# Patient Record
Sex: FEMALE | Race: WHITE
[De-identification: names, ages, dates, MRNs, and addresses within clinical notes are randomized per-mention and may not be internally consistent; named-entity substitution may affect disease eponyms.]

---

## 2020-05-08 ENCOUNTER — HOSPITAL ENCOUNTER (EMERGENCY)
Dept: HOSPITAL 56 - MW.ED | Age: 45
Discharge: HOME | End: 2020-05-08
Payer: COMMERCIAL

## 2020-05-08 DIAGNOSIS — S61.011A: Primary | ICD-10-CM

## 2020-05-08 DIAGNOSIS — Z23: ICD-10-CM
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
## 2020-05-08 DIAGNOSIS — F17.210: ICD-10-CM

## 2020-05-08 PROCEDURE — 12001 RPR S/N/AX/GEN/TRNK 2.5CM/<: CPT

## 2020-05-08 PROCEDURE — 99282 EMERGENCY DEPT VISIT SF MDM: CPT

## 2020-05-08 PROCEDURE — 90471 IMMUNIZATION ADMIN: CPT

## 2020-05-08 PROCEDURE — 90715 TDAP VACCINE 7 YRS/> IM: CPT

## 2020-05-08 NOTE — EDM.PDOC
ED HPI GENERAL MEDICAL PROBLEM





- General


Chief Complaint: Laceration


Stated Complaint: CUT FINGER


Time Seen by Provider: 05/08/20 11:32


Source of Information: Reports: Patient


History Limitations: Reports: No Limitations





- History of Present Illness


INITIAL COMMENTS - FREE TEXT/NARRATIVE: 





HISTORY AND PHYSICAL:





History of present illness:


Patient is a 44-year-old female presents to the ED with complaint of thumb 

laceration. Patient states about 1 hour prior to arrival to the ED she was 

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 Patient 

is uncertain of last tetanus. 





Review of systems: 


As per history of present illness and below otherwise all systems reviewed and 

negative.





Past medical history: 


As per history of present illness and as reviewed below otherwise 

noncontributory.





Surgical history: 


As per history of present illness and as reviewed below otherwise 

noncontributory.





Social history: 


No reported history of drug or alcohol abuse.





Family history: 


As per history of present illness and as reviewed below otherwise 

noncontributory.





Physical exam:


General: Patient sitting comfortably in no acute distress and nontoxic appearing


HEENT: Atraumatic, normocephalic, pupils reactive, negative for conjunctival 

pallor or scleral icterus, mucous membranes moist, throat clear, neck supple, 

nontender, trachea midline. No meningeal signs. 


Skin: The very distal tip of the right thumb is avulsed. Bleeding controlled. 

There is no proximal nail or bony involvement. 


Extremities: Atraumatic, negative for cords or calf pain. Neurovascular 

unremarkable.


Neuro: Awake, alert, oriented. Cranial nerves II through XII unremarkable. 

Cerebellum unremarkable. Motor and sensory unremarkable throughout. Exam 

nonfocal.





Notes: No active bleeding to right distal thumb. Wound was cleansed with 250cc 

saline. Dermabond applied to the area. Patient tolerated well. 





Therapeutics:


tdap





Impression: 


Right thumb injury 





Plan:


Keep the area clean and dry as instructed


Follow up with primary care provider


Return to ED as needed as discussed 





Definitive disposition and diagnosis as appropriate pending reevaluation and 

review of above.








  ** Right Finger-Thumb


Pain Score (Numeric/FACES): 5





- Related Data


 Allergies











Allergy/AdvReac Type Severity Reaction Status Date / Time


 


No Known Allergies Allergy   Verified 05/08/20 11:27











Home Meds: 


 Home Meds





. [No Known Home Meds]  05/08/20 [History]











Past Medical History


HEENT History: Reports: None


Cardiovascular History: Reports: None


Respiratory History: Reports: None


Gastrointestinal History: Reports: None


Genitourinary History: Reports: None


OB/GYN History: Reports: Pregnancy


Musculoskeletal History: Reports: None


Neurological History: Reports: None


Psychiatric History: Reports: Anxiety


Endocrine/Metabolic History: Reports: None


Hematologic History: Reports: None


Immunologic History: Reports: None


Oncologic (Cancer) History: Reports: None


Dermatologic History: Reports: None





- Infectious Disease History


Infectious Disease History: Reports: Measles, Mumps, Pertussis (Whooping Cough)

, Rubella





- Past Surgical History


Head Surgeries/Procedures: Reports: None





Social & Family History





- Tobacco Use


Smoking Status *Q: Current Every Day Smoker


Years of Tobacco use: 28


Packs/Tins Daily: 0.5





- Recreational Drug Use


Recreational Drug Use: No





ED ROS GENERAL





- Review of Systems


Review Of Systems: Comprehensive ROS is negative, except as noted in HPI.





ED EXAM, SKIN/RASH


Exam: See Below (see dictation)





Course





- Vital Signs


Last Recorded V/S: 


 Last Vital Signs











Temp  97.8 F   05/08/20 11:25


 


Pulse  88   05/08/20 11:25


 


Resp  16   05/08/20 11:25


 


BP  176/139 H  05/08/20 11:25


 


Pulse Ox  99   05/08/20 11:25














- Orders/Labs/Meds


Orders: 


 Active Orders 24 hr











 Category Date Time Status


 


 Vaccines to be Administered [RC] PER UNIT ROUTINE Care  05/08/20 11:37 Ordered











Meds: 


Medications














Discontinued Medications














Generic Name Dose Route Start Last Admin





  Trade Name Freq  PRN Reason Stop Dose Admin


 


Diphtheria/Tetanus/Acell Pertussis  0.5 ml  05/08/20 11:37  





  Adacel  IM  05/08/20 11:38  





  .ONCE ONE   





     





     





     





     


 


Octyl Cyanoacrylate  1 applic  05/08/20 11:37  





  Dermabond Advance  TOP  05/08/20 11:38  





  ONETIME ONE   





     





     





     





     














Departure





- Departure


Time of Disposition: 11:54


Disposition: Home, Self-Care 01


Condition: Good


Clinical Impression: 


 Injury of right thumb








- Discharge Information


Referrals: 


PCP,None [Primary Care Provider] - 


Forms:  ED Department Discharge


Additional Instructions: 


The following information is given to patients seen in the emergency department 

who are being discharged to home. This information is to outline your options 

for follow-up care. We provide all patients seen in our emergency department 

with a follow-up referral.





The need for follow-up, as well as the timing and circumstances, are variable 

depending upon the specifics of your emergency department visit.





If you don't have a primary care physician on staff, we will provide you with a 

referral. We always advise you to contact your personal physician following an 

emergency department visit to inform them of the circumstance of the visit and 

for follow-up with them and/or the need for any referrals to a consulting 

specialist.





The emergency department will also refer you to a specialist when appropriate. 

This referral assures that you have the opportunity for follow-up care with a 

specialist. All of these measure are taken in an effort to provide you with 

optimal care, which includes your follow-up.





Under all circumstances we always encourage you to contact your private 

physician who remains a resource for coordinating your care. When calling for 

follow-up care, please make the office aware that this follow-up is from your 

recent emergency room visit. If for any reason you are refused follow-up, 

please contact the [facility] Emergency 

Department at [phone] and asked to speak to the emergency department 

charge nurse.





[facility]


Primary Care


[address] [place] [address]


Phone: [phone]


Fax: [phone]





[place] [address]


Phone: [phone]


Fax: [phone]








Keep the area clean and dry as instructed


Follow up with primary care provider


Return to ED as needed as discussed 





Sepsis Event Note





- Evaluation


Sepsis Screening Result: No Definite Risk





- Focused Exam


Vital Signs: 


 Vital Signs











  Temp Pulse Resp BP Pulse Ox


 


 05/08/20 11:25  97.8 F  88  16  176/139 H  99











Date Exam was Performed: 05/08/20


Time Exam was Performed: 11:52





- My Orders


Last 24 Hours: 


My Active Orders





05/08/20 11:37


Vaccines to be Administered [RC] PER UNIT ROUTINE 














- Assessment/Plan


Last 24 Hours: 


My Active Orders





05/08/20 11:37


Vaccines to be Administered [RC] PER UNIT ROUTINE

## 2021-08-24 NOTE — EDM.PDOCBH
ED HPI GENERAL MEDICAL PROBLEM





- General


Chief Complaint: Behavioral/Psych


Stated Complaint: ANXIETY, DEPRESSION, THOUGHTS OF SELF HARM


Time Seen by Provider: 08/24/21 07:41


Source of Information: Reports: Patient


History Limitations: Reports: No Limitations





- History of Present Illness


INITIAL COMMENTS - FREE TEXT/NARRATIVE: 





Patient is a 46-year-old female brought in today for possible anxiety and 

feeling worthless.  Patient states that for the past few days she has been 

feeling very anxious and had difficulty trying to get herself to go to work or 

take care of her son.  She denies also 1 hurt anyone else but has thought about 

hurting herself because she is is sick of feeling this way in detail make the 

symptoms go away.  She denies any complaints of nausea vomiting fever chills 

abdominal pain.  She denies hearing voices or take any drugs or alcohol.





- Related Data


                                    Allergies











Allergy/AdvReac Type Severity Reaction Status Date / Time


 


No Known Allergies Allergy   Verified 08/24/21 07:44











Home Meds: 


                                    Home Meds





. [No Known Home Meds]  05/08/20 [History]











Past Medical History


HEENT History: Reports: None


Cardiovascular History: Reports: None


Respiratory History: Reports: None


Gastrointestinal History: Reports: None


Genitourinary History: Reports: None


OB/GYN History: Reports: Pregnancy


Musculoskeletal History: Reports: None


Neurological History: Reports: None


Psychiatric History: Reports: Anxiety


Endocrine/Metabolic History: Reports: None


Hematologic History: Reports: None


Immunologic History: Reports: None


Oncologic (Cancer) History: Reports: None


Dermatologic History: Reports: None





- Infectious Disease History


Infectious Disease History: Reports: Measles, Mumps, Pertussis (Whooping Cough),

 Rubella





- Past Surgical History


Head Surgeries/Procedures: Reports: None





ED ROS GENERAL





- Review of Systems


Review Of Systems: See Below


Constitutional: Reports: No Symptoms


HEENT: Reports: No Symptoms


Respiratory: Reports: No Symptoms


Cardiovascular: Reports: No Symptoms


Endocrine: Reports: No Symptoms


GI/Abdominal: Reports: No Symptoms


: Reports: No Symptoms


Musculoskeletal: Reports: No Symptoms


Skin: Reports: No Symptoms


Neurological: Reports: No Symptoms


Psychiatric: Reports: Anxiety, Depression


Hematologic/Lymphatic: Reports: No Symptoms


Immunologic: Reports: No Symptoms





ED EXAM, BEHAVIORAL HEALTH





- Physical Exam


Exam: See Below


Exam Limited By: No Limitations


General Appearance: Alert


Eye Exam: Bilateral Eye: EOMI, PERRL


Nose: Normal Inspection


Throat/Mouth: Normal Inspection


Head: Atraumatic


Neck: Normal Inspection


Respiratory/Chest: No Respiratory Distress, Lungs Clear, Normal Breath Sounds


Cardiovascular: Normal Peripheral Pulses, Regular Rate, Rhythm


GI/Abdominal: Normal Bowel Sounds, Soft, Non-Tender


Neurological: Alert, Oriented x 3


Psychiatric: Alert, Normal Cognition, Normal Mood, Oriented





COURSE, BEHAVIORAL HEALTH COMP





- Course


Vital Signs: 


                                Last Vital Signs











Temp  97.4 F   08/24/21 07:44


 


Pulse  76   08/24/21 08:05


 


Resp  15   08/24/21 07:44


 


BP  120/70   08/24/21 08:05


 


Pulse Ox  99   08/24/21 08:05











Orders, Labs, Meds: 


                               Active Orders 24 hr











 Category Date Time Status


 


 CORONAVIRUS COVID-19 SOPHIA [MOLEC] Stat Lab  08/24/21 08:01 Received


 


 DRUG SCREEN, URINE [URCHEM] Stat Lab  08/24/21 08:05 Received











Medical Clearance: 





08/24/21 08:16


Vital signs are stable and again patient has no medical complaints.  We were 

able to speak to [facility] and patient can be seen there today.  We will 

discharge patient to their services the patient is not actively suicidal.





Departure





- Departure


Time of Disposition: 08:17


Disposition: Home, Self-Care 01


Condition: Good


Clinical Impression: 


 Depression








- Discharge Information


*PRESCRIPTION DRUG MONITORING PROGRAM REVIEWED*: Not Applicable


*COPY OF PRESCRIPTION DRUG MONITORING REPORT IN PATIENT ANIA: Not Applicable


Instructions:  Major Depressive Disorder, Adult, Easy-to-Read


Referrals: 


PCP,None [Primary Care Provider] - 


Forms:  ED Department Discharge


Additional Instructions: 


The following information is given to patients seen in the emergency department 

who are being discharged to home. This information is to outline your options 

for follow-up care. We provide all patients seen in our emergency department 

with a follow-up referral.





The need for follow-up, as well as the timing and circumstances, are variable 

depending upon the specifics of your emergency department visit.





If you don't have a primary care physician on staff, we will provide you with a 

referral. We always advise you to contact your personal physician following an 

emergency department visit to inform them of the circumstance of the visit and 

for follow-up with them and/or the need for any referrals to a consulting 

specialist.





The emergency department will also refer you to a specialist when appropriate. 

This referral assures that you have the opportunity for follow-up care with a 

specialist. All of these measure are taken in an effort to provide you with 

optimal care, which includes your follow-up.





Under all circumstances we always encourage you to contact your private 

physician who remains a resource for coordinating your care. When calling for 

follow-up care, please make the office aware that this follow-up is from your 

recent emergency room visit. If for any reason you are refused follow-up, please

contact the [facility] Emergency Department

at [phone] and asked to speak to the emergency department charge nurse.





Please follow up with your primary care physician. If you do not have a primary 

care physician, see below:


[facility] Primary Care


[address] [place] [id number] [phone]





[facility]


[address] [place] [address] [phone]








You were seen today in the ED for thoughts of anxiety and possible depression.  

We were able to get in contact with our local facility [facility] they can

see you today.  We would like you to go directly there to be evaluated.  If 

after being evaluated by them you are still having any thoughts of harming 

yourself or others please return to the ED immediately.





Sepsis Event Note (ED)





- Focused Exam


Vital Signs: 


                                   Vital Signs











  Temp Pulse Resp BP Pulse Ox


 


 08/24/21 08:05   76   120/70  99


 


 08/24/21 07:44  97.4 F  72  15  123/82  99














- My Orders


Last 24 Hours: 


My Active Orders





08/24/21 08:01


CORONAVIRUS COVID-19 SOPHIA [MOLEC] Stat 





08/24/21 08:05


DRUG SCREEN, URINE [URCHEM] Stat 














- Assessment/Plan


Last 24 Hours: 


My Active Orders





08/24/21 08:01


CORONAVIRUS COVID-19 SOPHIA [MOLEC] Stat 





08/24/21 08:05


DRUG SCREEN, URINE [URCHEM] Stat 











Plan: 





Patient is a 46-year-old female who presented today for feeling anxious and 

depressed.  Patient states that she occasionally has some thoughts of harming 

herself but is not actively suicidal.  Patient has no medical complaints will 

medically clear have patient speak to our psychiatrist.